# Patient Record
(demographics unavailable — no encounter records)

---

## 2025-01-28 NOTE — HISTORY OF PRESENT ILLNESS
[EENT/Resp Symptoms] : EENT/RESPIRATORY SYMPTOMS [Runny nose] : runny nose [Nasal congestion] : nasal congestion [Chest congestion] : chest congestion [___ Day(s)] : [unfilled] day(s) [Constant] : constant [Fatigued] : fatigued [Known Exposure to COVID-19] : no known exposure to COVID-19 [Hx of recent COVID-19 infection] : no history of recent COVID-19 infection [Sick Contacts: ___] : no sick contacts [Clear rhinorrhea] : clear rhinorrhea [Wet cough] : wet cough [At Night] : at night [In Morning] : in morning [Fever] : fever [Headache] : headache [Change in sleep] : no change in sleep  [Eye Redness] : no eye redness [Eye Discharge] : no eye discharge [Eye Itching] : no eye itching [Ear Pain] : no ear pain [Rhinorrhea] : rhinorrhea [Nasal Congestion] : nasal congestion [Sore Throat] : sore throat [Palpitations] : no palpitations [Chest Pain] : no chest pain [Cough] : cough [Wheezing] : no wheezing [Shortness of Breath] : no shortness of breath [Tachypnea] : no tachypnea [Decreased Appetite] : no decreased appetite [Posttussive emesis] : no posttussive emesis [Vomiting] : no vomiting [Diarrhea] : no diarrhea [Decreased Urine Output] : no decreased urine output [Rash] : no rash

## 2025-01-29 NOTE — PHYSICAL EXAM

## 2025-01-29 NOTE — HISTORY OF PRESENT ILLNESS
[Normal] : Normal [Water heater temperature set at <120 degrees F] : Water heater temperature set at <120 degrees F [Car seat in back seat] : Car seat in back seat [Carbon Monoxide Detectors] : Carbon monoxide detectors [Smoke Detectors] : Smoke detectors [Supervised outdoor play] : Supervised outdoor play [NO] : No [Mother] : mother [Fruit] : fruit [Vegetables] : vegetables [Meat] : meat [Grains] : grains [Eggs] : eggs [Fish] : fish [Dairy] : dairy [___ stools per day] : [unfilled]  stools per day [___ voids per day] : [unfilled] voids per day [Toilet Trained] : toilet trained [In own bed] : In own bed [Pacifier use] : Pacifier use [Brushing teeth] : Brushing teeth [Yes] : Patient goes to dentist yearly [Toothpaste] : Primary Fluoride Source: Toothpaste [In Pre-K] : In Pre-K [Curiosity about body] : Curiosity about body [Playtime (60 min/d)] : Playtime 60 min a day [< 2 hrs of screen time] : Less than 2 hrs of screen time [Appropiate parent-child communication] : Appropriate parent-child communication [Child given choices] : Child given choices [Child Cooperates] : Child cooperates [No] : Not at  exposure [Up to date] : Up to date [TV in bedroom] : TV in bedroom [Parent has appropriate responses to behavior] : Parent has appropriate responses to behavior [Exposure to electronic nicotine delivery system] : No exposure to electronic nicotine delivery system [FreeTextEntry7] : No hospitalization/Surgeries, Specialist visit. [de-identified] : Cough worsening, RVP not back yet

## 2025-01-29 NOTE — DEVELOPMENTAL MILESTONES
[Normal Development] : Normal Development [None] : none [Goes to the bathroom and has] : goes to bathroom and has bowel movement by self [Dresses and undresses without] : dresses and undresses without much help [Plays make-believe] : plays make-believe [Uses 4-word sentences] : uses 4-word sentences [Uses words that are 100%] : uses words that are 100% intelligible to strangers [Tells a story from a book] : tells a story from a book [Climbs stairs, alternating feet] : climbs stairs, alternating feet without support [Skips on one foot] : skips on one foot [Draws a person with head and] : draws a person with head and 3 body part [Draws a simple cross] : draws a simple cross [Unbuttons medium-sized buttons] : unbuttons medium sized buttons [Grasps a pencil with thumb and] : grasps a pencil with thumb and fingers instead of fist [Draws recognizable pictures] : draws recognizable pictures [Yes] : Completed. [FreeTextEntry1] : Passed